# Patient Record
Sex: MALE
[De-identification: names, ages, dates, MRNs, and addresses within clinical notes are randomized per-mention and may not be internally consistent; named-entity substitution may affect disease eponyms.]

---

## 2018-04-13 ENCOUNTER — HOSPITAL ENCOUNTER (INPATIENT)
Dept: HOSPITAL 42 - ED | Age: 28
LOS: 3 days | Discharge: HOME | DRG: 494 | End: 2018-04-16
Attending: INTERNAL MEDICINE | Admitting: INTERNAL MEDICINE
Payer: COMMERCIAL

## 2018-04-13 DIAGNOSIS — K76.0: ICD-10-CM

## 2018-04-13 DIAGNOSIS — S82.242A: Primary | ICD-10-CM

## 2018-04-13 DIAGNOSIS — D72.829: ICD-10-CM

## 2018-04-13 DIAGNOSIS — J45.909: ICD-10-CM
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
## 2018-04-13 DIAGNOSIS — D64.9: ICD-10-CM
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
## 2018-04-13 DIAGNOSIS — S82.442A: ICD-10-CM

## 2018-04-13 LAB
ALBUMIN SERPL-MCNC: 4.8 G/DL (ref 3–4.8)
ALBUMIN/GLOB SERPL: 1.4 {RATIO} (ref 1.1–1.8)
ALT SERPL-CCNC: 81 U/L (ref 7–56)
APTT BLD: 29.1 SECONDS (ref 25.1–36.5)
AST SERPL-CCNC: 68 U/L (ref 17–59)
BASOPHILS # BLD AUTO: 0.03 K/MM3 (ref 0–2)
BASOPHILS NFR BLD: 0.3 % (ref 0–3)
BUN SERPL-MCNC: 19 MG/DL (ref 7–21)
CALCIUM SERPL-MCNC: 9.9 MG/DL (ref 8.4–10.5)
CK MB SERPL-MCNC: 0.9 NG/ML (ref 0–3.6)
EOSINOPHIL # BLD: 0.1 10*3/UL (ref 0–0.7)
EOSINOPHIL NFR BLD: 1 % (ref 1.5–5)
ERYTHROCYTE [DISTWIDTH] IN BLOOD BY AUTOMATED COUNT: 12.7 % (ref 11.5–14.5)
GFR NON-AFRICAN AMERICAN: > 60
GRANULOCYTES # BLD: 7.61 10*3/UL (ref 1.4–6.5)
GRANULOCYTES NFR BLD: 78 % (ref 50–68)
HGB BLD-MCNC: 16.3 G/DL (ref 14–18)
INR PPP: 1.09 (ref 0.93–1.08)
LYMPHOCYTES # BLD: 1.5 10*3/UL (ref 1.2–3.4)
LYMPHOCYTES NFR BLD AUTO: 14.9 % (ref 22–35)
MCH RBC QN AUTO: 31.9 PG (ref 25–35)
MCHC RBC AUTO-ENTMCNC: 36.2 G/DL (ref 31–37)
MCV RBC AUTO: 88.1 FL (ref 80–105)
MONOCYTES # BLD AUTO: 0.6 10*3/UL (ref 0.1–0.6)
MONOCYTES NFR BLD: 5.8 % (ref 1–6)
PLATELET # BLD: 258 10^3/UL (ref 120–450)
PMV BLD AUTO: 10.3 FL (ref 7–11)
PROTHROMBIN TIME: 12.5 SECONDS (ref 9.4–12.5)
RBC # BLD AUTO: 5.11 10^6/UL (ref 3.5–6.1)
TROPONIN I SERPL-MCNC: < 0.01 NG/ML
WBC # BLD AUTO: 9.8 10^3/UL (ref 4.5–11)

## 2018-04-13 NOTE — CT
EXAM:

  CT Left Lower Extremity Without Intravenous Contrast, Tibia and Fibula



CLINICAL HISTORY:

  27 years old, male; Pain; Lower leg; Left; Additional info: Left lower leg 

fracture



TECHNIQUE:

  Axial computed tomography images of the left tibia and fibula without 

intravenous contrast.  All CT scans at this facility use one or more dose 

reduction techniques, viz.: automated exposure control; ma/kV adjustment per 

patient size (including targeted exams where dose is matched to indication; 

i.e. head); or iterative reconstruction technique.

  Coronal and sagittal reformatted images were created and reviewed.



COMPARISON:

  DX - TIBIA FIBULA LEFT 2018-04-13 20:00



FINDINGS:

  Bones/joints:  Comminuted, spiral fracture distal tibial metadiaphysis. 

Lateral and posterior displacement of principal fracture fragment with 

rotation. Nondisplaced vertical fracture posterior malleolus.  Comminuted 

spiral fracture fibular diaphysis. Lateral and anterior displacement of 

principal distal fracture fragment with rotation.  No dislocation.

  Soft tissues:  Soft tissue stranding/swelling/few tiny foci of air about 

lower leg.



IMPRESSION:     

1.  Tibial and fibular fractures.
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
## 2018-04-13 NOTE — US
EXAM:

  US Abdomen Limited, Right Upper Quadrant



CLINICAL HISTORY:

  27 years old, male; Abnormal findings; Abnormal lab test; Elevated liver 

enzymes; Additional info: Elevated lft



TECHNIQUE:

  Real-time ultrasound of the right upper quadrant with image documentation.



COMPARISON:

  No relevant prior studies available.



FINDINGS:

  Liver:  Fatty infiltration.  No mass.  No intrahepatic ductal dilatation.

  Gallbladder:  No definite gallstones.  No wall thickening.  No 

pericholecystic fluid.  No sonographic Caal's sign.

  Common bile duct:  No dilatation.  No stones.

  Pancreas:  Obscured by overlying bowel gas.

  Right kidney:  Normal echogenicity.  No hydronephrosis.



IMPRESSION:     

1.No acute findings.

2.Non-acute findings are described above.
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
## 2018-04-14 LAB
ALBUMIN SERPL-MCNC: 4 G/DL (ref 3–4.8)
ALBUMIN/GLOB SERPL: 1.7 {RATIO} (ref 1.1–1.8)
ALT SERPL-CCNC: 58 U/L (ref 7–56)
AST SERPL-CCNC: 42 U/L (ref 17–59)
BUN SERPL-MCNC: 12 MG/DL (ref 7–21)
CALCIUM SERPL-MCNC: 7.9 MG/DL (ref 8.4–10.5)
GFR NON-AFRICAN AMERICAN: > 60

## 2018-04-14 PROCEDURE — 0QSH04Z REPOSITION LEFT TIBIA WITH INTERNAL FIXATION DEVICE, OPEN APPROACH: ICD-10-PCS

## 2018-04-14 PROCEDURE — 3E0T3BZ INTRODUCTION OF ANESTHETIC AGENT INTO PERIPHERAL NERVES AND PLEXI, PERCUTANEOUS APPROACH: ICD-10-PCS

## 2018-04-14 PROCEDURE — 0QSKXZZ REPOSITION LEFT FIBULA, EXTERNAL APPROACH: ICD-10-PCS

## 2018-04-14 NOTE — PCM.ANESB3
Femoral Nerve Block





- Femoral Nerve Block


Date of Procedure: 04/14/18


Anesthesiologist: Adams Garza, M>D>


Pre-Procedure Diagnosis: fracture left tibia


Post-Procedure Diagnosis: fracture left tibia


Procedure Performed: Femoral Nerve Block Left





- Procedure


Femoral Nerve Block: 


The procedure was explained to the patient that it is for the post-operative 

pain management. Consent was obtained after a thorough discussion with the 

patient regarding the benefits and possible complications of local anesthetic 

block of the femoral nerve at the inguinal crease area. The patient was brought 

to the operating room and standard monitors were applied. Time-out was held 

with the circulating nurse to confirm the correct surgery and the appropriate 

block. After surgery was done under general anesthesia.





. The femoral artery was then carefully palpated. The ultrasound transducer was 

then applied to this area in the transverse plane and the femoral nerve was 

visualized lateral to the femoral artery and underneath the fascia iliaca. 

After thorough identification, the inguinal crease area was prepped with 

Betadine solution three times and 1 % Lidocaine was injected subcutaneously for 

topical anesthesia. 





At this point, a #22 gauge Stimuplex 2-inch needle was inserted immediately 

lateral to the femoral artery pulse at the inguinal crease and advanced 

perpendicularly. The needle was inserted to the ultrasound transducer in-plane 

towards the femoral nerve in a lateral-to-medial direction. Needle advancement 

was performed carefully under direct ultrasound visualization. Nerve stimulator 

was used and twitch of the quadriceps muscle was obtained at current of _.4____

MA. After negative aspiration, _15____cc of _.5____% _bupivacaine_______________

____was  injected and this was followed with __10____ cc of __2_____ % _

lidocaine_____________. Under ultrasound guidance the local anesthetics were 

observed spreading below fascia iliaca and around the femoral nerve. The needle 

was removed intact and sterile dressing was applied. The patient had stable 

vital signs.





The patient tolerated the femoral nerve block well with stable vital signs and 

was prepared for subsequent surgery.

## 2018-04-14 NOTE — CARD
--------------- APPROVED REPORT --------------





EKG Measurement

Heart [id number]

RI [id number]

[id number]

[id number]

[id number]



<Conclusion>

Normal sinus rhythm

Normal ECG

## 2018-04-14 NOTE — PCM.ANESB2
Popliteal Nerve Block





- Popliteal Nerve Block


Date of Procedure: 04/14/18


Anesthesiologist: Adams Garza M.D.


Pre-Procedure Diagnosis: fracture left tibia


Post-Procedure Diagnosis: Fracture left tibia


Procedure Performed: Popliteal Nerve Block Left





- Procedure


Popliteal Nerve Block: 


This procedure was explained to the patient that it is for post-operative pain 

management. Consent was obtained after a thorough discussion with the patient 

regarding the benefits and possible complications of local anesthetic block of 

the sciatic nerve at the popliteal level. The patient was brought to the 

operating room and standard monitors are applied. Time-out was held with the 

circulating nurse to confirm the correct surgery and the appropriate block. 

After operaton was finished under general anesthesia,


, patient's operative leg was gently raised and supported and the groove in 

between the biceps femoris and vastus lateralis muscles was carefully palpated. 

The skin approximately 8cm above the popliteal crease was then marked. The 

ultrasound transducer was then applied to the posterior thigh approximately 8cm 

above the popliteal crease in the transverse plane and the sciatic nerve before 

its division was visualized lateral to the popliteal artery and in between the 

bicep femoris and semimembranosus/semitendinosus muscles. After identification, 

the lateral portion of the thigh was prepped with Betadine solution three times 

and Lidocaine 1% was injected subcutaneously for topical anesthesia. 





At this point, a # 21 gauge Stimuplex insulated 4 inch needle was inserted into 

pre-marked area and advanced in a perpendicular direction. The needle was 

inserted above the ultrasound transducer in-plane towards the sciatic nerve in 

a lateral-to-medial direction. Needle advancement was performed carefully under 

direct ultrasound visualization. Nerve stimulator was used and dorsiflexion of 

the _left____ foot was elicited at a current of _.4____ MA. After repeated 

negative aspiration, _15____cc of __.5___ % _Bupivacaine_____________ was 

injected and this was flowed with _10_____ cc of _2_____% _Lidocaine___________

. Under ultrasound guidance the local anesthetics were observed surrounding 

sciatic nerve . The needle was removed intact and sterile dressing was applied.





The patient tolerated the popliteal nerve block well with stable vital signs 


.

## 2018-04-14 NOTE — RAD
PROCEDURE:  Radiographs of the left tibia and fibula. 



HISTORY:

s/p left leg surgery



COMPARISON:

None available.



TECHNIQUE:

Frontal and lateral views obtained. 



FINDINGS:



BONES:

No fracture or destructive lesion. Minimally displaced oblique 

fracture mid fibular diaphysis.  Status post ORIF distal tibial 

fracture with plate and screw fixation. Fragments in anatomic 

alignment.  Bony detail obscured by overlying plaster splint.



JOINT SPACES:

Unremarkable.



OTHER FINDINGS:

None.



IMPRESSION:

ORIF distal tibial fracture. Minimally displaced mid fibular fracture.
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
## 2018-04-14 NOTE — RAD
HISTORY:

admission  



COMPARISON:

No prior. 



FINDINGS:



LUNGS:

No active pulmonary disease.



PLEURA:

No significant pleural effusion identified, no pneumothorax apparent.



CARDIOVASCULAR:

Normal.



OSSEOUS STRUCTURES:

No significant abnormalities.



VISUALIZED UPPER ABDOMEN:

Normal.



OTHER FINDINGS:

None.



IMPRESSION:

No active disease.

## 2018-04-15 VITALS — OXYGEN SATURATION: 97 %

## 2018-04-15 LAB
ALBUMIN SERPL-MCNC: 4 G/DL (ref 3–4.8)
ALBUMIN/GLOB SERPL: 1.5 {RATIO} (ref 1.1–1.8)
ALT SERPL-CCNC: 51 U/L (ref 7–56)
AST SERPL-CCNC: 44 U/L (ref 17–59)
BUN SERPL-MCNC: 7 MG/DL (ref 7–21)
CALCIUM SERPL-MCNC: 7.9 MG/DL (ref 8.4–10.5)
ERYTHROCYTE [DISTWIDTH] IN BLOOD BY AUTOMATED COUNT: 12.7 % (ref 11.5–14.5)
GFR NON-AFRICAN AMERICAN: > 60
HEPATITIS A IGM: NEGATIVE
HEPATITIS B CORE AB: NEGATIVE
HEPATITIS C ANTIBODY: NEGATIVE
HGB BLD-MCNC: 12.9 G/DL (ref 14–18)
MCH RBC QN AUTO: 31.3 PG (ref 25–35)
MCHC RBC AUTO-ENTMCNC: 35.3 G/DL (ref 31–37)
MCV RBC AUTO: 88.6 FL (ref 80–105)
PLATELET # BLD: 208 10^3/UL (ref 120–450)
PMV BLD AUTO: 10 FL (ref 7–11)
RBC # BLD AUTO: 4.12 10^6/UL (ref 3.5–6.1)
WBC # BLD AUTO: 14.4 10^3/UL (ref 4.5–11)

## 2018-04-15 RX ADMIN — OXYCODONE HYDROCHLORIDE AND ACETAMINOPHEN PRN TAB: 5; 325 TABLET ORAL at 01:10

## 2018-04-15 RX ADMIN — OXYCODONE HYDROCHLORIDE AND ACETAMINOPHEN PRN TAB: 5; 325 TABLET ORAL at 12:39

## 2018-04-15 RX ADMIN — OXYCODONE HYDROCHLORIDE AND ACETAMINOPHEN PRN TAB: 5; 325 TABLET ORAL at 08:52

## 2018-04-15 RX ADMIN — OXYCODONE HYDROCHLORIDE AND ACETAMINOPHEN PRN TAB: 5; 325 TABLET ORAL at 16:44

## 2018-04-15 RX ADMIN — OXYCODONE HYDROCHLORIDE AND ACETAMINOPHEN PRN TAB: 5; 325 TABLET ORAL at 22:02

## 2018-04-15 NOTE — PCM.SURG1
Surgeon's Initial Post Op Note





- Surgeon's Notes


Surgeon: reji


Assistant: daniel


Type of Anesthesia: General Endo


Pre-Operative Diagnosis: displaced distal tibia and fibular fracture


Operative Findings: see dictation


Post-Operative Diagnosis: same


Operation Performed: ORIF distal tibia.  closed treatment fibula fx


Specimen/Specimens Removed: none


Estimated Blood Loss: EBL {In ML}: 50


Post-Op Condition: Good


Date of Surgery/Procedure: 04/14/18


Time of Surgery/Procedure: 11:00

## 2018-04-15 NOTE — PN
DATE:  04/15/2018



SUBJECTIVE:  This 27-year-old male was examined at his bedside and this

case was reviewed in detail with himself, his family member, Hilda Arboleda

and his nurse, Verenice Mullen.  The patient was seen earlier today by Physical

Therapy.  He was instructed on ambulating with crutches and is aware that he is

to have no weightbearing on his left ankle till further notice.  At

present, he is tolerating oral Percocet, but did require parenteral

morphine earlier this morning.



PHYSICAL EXAMINATION:  His temperature max was 99.7 last evening with

respirations 18, pulse 74 and blood pressure 124/71 and a pulse ox of 99%

on room air.

HEENT:  Head normocephalic, atraumatic.  Eyes:  No icterus.  Ears:  Clear. 

Throat:  Noninjected.

NECK:  Supple.

HEART:  Regular S1, S2.

LUNGS:  Clear.

ABDOMEN:  Soft.

EXTREMITIES:  No edema.

SKIN:  Without rash.

NEUROLOGICAL:  Intact.

PSYCHOLOGICAL:  Alert.

VASCULAR:  Legs warm to touch.  Left foot and ankle remains in a cast. 

Exposed toes are warm to touch with excellent capillary refill and no

evidence of cyanosis.



LABORATORY DATA:  White count 14,400, hemoglobin 12.9, hematocrit 36.5,

platelets 208,000.  Sodium 139, K 3.8, chloride 103, bicarb 26, BUN 7,

creatinine 0.8, random blood sugar 136.  Bilirubin 0.7, AST 44, ALT 51, alk

phos 60.  Hepatitis A, B serology was negative.  Abdominal ultrasound was

consistent with fatty infiltration of his liver.



IMPRESSION:  A 27-year-old male with traumatic fracture of his left tibia

and fibula that required open reduction and internal fixation with a plate

and screws applied to his tibial fracture.  X-ray now shows fragments in

anatomic alignment. Now with postoperative leukocytosis and expected mild 
anemia. 

Admitted with elevated liver function testing, now resolved, presumed

secondary to fatty infiltration of the liver.



PLAN:  The plan as discussed with the patient and family member at bedside

will be to encourage physical therapy with no weightbearing on left ankle

until further notice.  He is also receiving Percocet 5-325 one tablet p.o.

every 4 hours p.r.n. severe pain, Tylenol 650 by mouth every 4 hours p.r.n.

mild-to-moderate pain.  The patient was instructed on the use of incentive

spirometry, encouraged to drink fluids and will have a repeat CBC in the

a.m.  Hopefully, he will have no fevers and leukocytosis will improve,

which will expedite his discharge to home with the patient aware that he

will need to follow up with Dr. Irineo Flores, Orthopedics as he has

outlined.  All of the above was discussed in detail at bedside.  All

questions were answered.





__________________________________________

Emmy Jaramillo MD





DD:  04/15/2018 15:36:53

DT:  04/15/2018 15:41:18

Job # [id number]



[email or web address]
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
## 2018-04-16 VITALS
RESPIRATION RATE: 20 BRPM | HEART RATE: 94 BPM | DIASTOLIC BLOOD PRESSURE: 66 MMHG | SYSTOLIC BLOOD PRESSURE: 118 MMHG | TEMPERATURE: 98.5 F

## 2018-04-16 LAB
ERYTHROCYTE [DISTWIDTH] IN BLOOD BY AUTOMATED COUNT: 12.7 % (ref 11.5–14.5)
HGB BLD-MCNC: 13.1 G/DL (ref 14–18)
MCH RBC QN AUTO: 31.1 PG (ref 25–35)
MCHC RBC AUTO-ENTMCNC: 35.1 G/DL (ref 31–37)
MCV RBC AUTO: 88.6 FL (ref 80–105)
PLATELET # BLD: 200 10^3/UL (ref 120–450)
PMV BLD AUTO: 9.7 FL (ref 7–11)
RBC # BLD AUTO: 4.21 10^6/UL (ref 3.5–6.1)
WBC # BLD AUTO: 11.8 10^3/UL (ref 4.5–11)

## 2018-04-16 RX ADMIN — OXYCODONE HYDROCHLORIDE AND ACETAMINOPHEN PRN TAB: 5; 325 TABLET ORAL at 12:27

## 2018-04-16 RX ADMIN — OXYCODONE HYDROCHLORIDE AND ACETAMINOPHEN PRN TAB: 5; 325 TABLET ORAL at 02:04
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
